# Patient Record
Sex: FEMALE | Race: WHITE
[De-identification: names, ages, dates, MRNs, and addresses within clinical notes are randomized per-mention and may not be internally consistent; named-entity substitution may affect disease eponyms.]

---

## 2017-02-20 ENCOUNTER — MYAURORA ACCOUNT LINK (OUTPATIENT)
Dept: OTHER | Age: 72
End: 2017-02-20

## 2017-02-20 ENCOUNTER — PRIOR ORIGINAL RECORDS (OUTPATIENT)
Dept: OTHER | Age: 72
End: 2017-02-20

## 2017-02-20 ENCOUNTER — LAB ENCOUNTER (OUTPATIENT)
Dept: LAB | Facility: HOSPITAL | Age: 72
End: 2017-02-20
Attending: INTERNAL MEDICINE
Payer: MEDICARE

## 2017-02-20 ENCOUNTER — HOSPITAL ENCOUNTER (OUTPATIENT)
Dept: GENERAL RADIOLOGY | Facility: HOSPITAL | Age: 72
Discharge: HOME | End: 2017-02-20
Attending: INTERNAL MEDICINE
Payer: MEDICARE

## 2017-02-20 DIAGNOSIS — Z01.818 PRE-OPERATIVE EXAM: ICD-10-CM

## 2017-02-20 DIAGNOSIS — Z01.818 PRE-OP TESTING: Primary | ICD-10-CM

## 2017-02-20 LAB
BASOPHILS # BLD AUTO: 0.04 X10(3) UL (ref 0–0.1)
BASOPHILS NFR BLD AUTO: 0.5 %
BUN BLD-MCNC: 16 MG/DL (ref 8–20)
CALCIUM BLD-MCNC: 10.3 MG/DL (ref 8.3–10.3)
CHLORIDE: 91 MMOL/L (ref 101–111)
CHOLEST SMN-MCNC: 175 MG/DL (ref ?–200)
CO2: 32 MMOL/L (ref 22–32)
CREAT BLD-MCNC: 0.77 MG/DL (ref 0.55–1.02)
EOSINOPHIL # BLD AUTO: 0.1 X10(3) UL (ref 0–0.3)
EOSINOPHIL NFR BLD AUTO: 1.3 %
ERYTHROCYTE [DISTWIDTH] IN BLOOD BY AUTOMATED COUNT: 14.7 % (ref 11.5–16)
GLUCOSE BLD-MCNC: 102 MG/DL (ref 70–99)
HCT VFR BLD AUTO: 36.5 % (ref 34–50)
HDLC SERPL-MCNC: 66 MG/DL (ref 45–?)
HDLC SERPL: 2.65 {RATIO} (ref ?–4.44)
HGB BLD-MCNC: 13.2 G/DL (ref 12–16)
IMMATURE GRANULOCYTE COUNT: 0.03 X10(3) UL (ref 0–1)
IMMATURE GRANULOCYTE RATIO %: 0.4 %
LDLC SERPL CALC-MCNC: 88 MG/DL (ref ?–130)
LYMPHOCYTES # BLD AUTO: 1.36 X10(3) UL (ref 0.9–4)
LYMPHOCYTES NFR BLD AUTO: 17 %
MCH RBC QN AUTO: 31.7 PG (ref 27–33.2)
MCHC RBC AUTO-ENTMCNC: 36.2 G/DL (ref 31–37)
MCV RBC AUTO: 87.7 FL (ref 81–100)
MONOCYTES # BLD AUTO: 0.94 X10(3) UL (ref 0.1–0.6)
MONOCYTES NFR BLD AUTO: 11.8 %
NEUTROPHIL ABS PRELIM: 5.52 X10 (3) UL (ref 1.3–6.7)
NEUTROPHILS # BLD AUTO: 5.52 X10(3) UL (ref 1.3–6.7)
NEUTROPHILS NFR BLD AUTO: 69 %
NONHDLC SERPL-MCNC: 109 MG/DL (ref ?–130)
PLATELET # BLD AUTO: 298 10(3)UL (ref 150–450)
POTASSIUM SERPL-SCNC: 3.6 MMOL/L (ref 3.6–5.1)
RBC # BLD AUTO: 4.16 X10(6)UL (ref 3.8–5.1)
RED CELL DISTRIBUTION WIDTH-SD: 47.7 FL (ref 35.1–46.3)
SODIUM SERPL-SCNC: 128 MMOL/L (ref 136–144)
TRIGLYCERIDES: 105 MG/DL (ref ?–150)
VLDL: 21 MG/DL (ref 5–40)
WBC # BLD AUTO: 8 X10(3) UL (ref 4–13)

## 2017-02-20 PROCEDURE — 85025 COMPLETE CBC W/AUTO DIFF WBC: CPT

## 2017-02-20 PROCEDURE — 80061 LIPID PANEL: CPT

## 2017-02-20 PROCEDURE — 36415 COLL VENOUS BLD VENIPUNCTURE: CPT

## 2017-02-20 PROCEDURE — 71020 XR CHEST PA + LAT CHEST (CPT=71020): CPT

## 2017-02-20 PROCEDURE — 80048 BASIC METABOLIC PNL TOTAL CA: CPT

## 2017-02-20 RX ORDER — DILTIAZEM HYDROCHLORIDE 180 MG/1
180 CAPSULE, COATED, EXTENDED RELEASE ORAL EVERY 12 HOURS
COMMUNITY
End: 2019-05-31

## 2017-02-20 RX ORDER — DULOXETIN HYDROCHLORIDE 30 MG/1
60 CAPSULE, DELAYED RELEASE ORAL NIGHTLY
COMMUNITY
End: 2019-05-31

## 2017-02-20 RX ORDER — PREDNISONE 1 MG/1
5 TABLET ORAL
COMMUNITY

## 2017-02-20 RX ORDER — GABAPENTIN 300 MG/1
600 CAPSULE ORAL NIGHTLY
COMMUNITY

## 2017-02-20 RX ORDER — ASPIRIN 325 MG
325 TABLET ORAL DAILY
COMMUNITY
End: 2019-05-31

## 2017-02-20 RX ORDER — GABAPENTIN 300 MG/1
600 CAPSULE ORAL DAILY
COMMUNITY

## 2017-02-20 RX ORDER — OMEGA-3/DHA/EPA/FISH OIL 500-1000MG
1500 CAPSULE ORAL DAILY
COMMUNITY

## 2017-02-20 RX ORDER — LEVOTHYROXINE SODIUM 0.05 MG/1
50 TABLET ORAL
COMMUNITY

## 2017-02-20 RX ORDER — METOPROLOL SUCCINATE 25 MG/1
25 TABLET, EXTENDED RELEASE ORAL 3 TIMES DAILY
COMMUNITY

## 2017-02-20 RX ORDER — CALCIUM/D3/MAG OX/COP/MANG/ZN 300 MG-20
1 TABLET ORAL DAILY
COMMUNITY

## 2017-02-20 RX ORDER — MELOXICAM 15 MG/1
15 TABLET ORAL DAILY
COMMUNITY

## 2017-02-20 RX ORDER — SIMVASTATIN 10 MG
10 TABLET ORAL NIGHTLY
COMMUNITY

## 2017-02-20 RX ORDER — HYDROCHLOROTHIAZIDE 25 MG/1
12.5 TABLET ORAL DAILY
COMMUNITY

## 2017-02-20 RX ORDER — PANTOPRAZOLE SODIUM 40 MG/1
40 TABLET, DELAYED RELEASE ORAL
COMMUNITY

## 2017-02-20 RX ORDER — GABAPENTIN 300 MG/1
300 CAPSULE ORAL
COMMUNITY
End: 2019-02-15

## 2017-02-20 RX ORDER — OXYBUTYNIN CHLORIDE 5 MG/1
5 TABLET ORAL DAILY
COMMUNITY

## 2017-02-20 NOTE — H&P
Razia Kumar  : 1945  ACCOUNT:  [id number]  [phone]  PCP: Dr. Camara Scale    TODAY'S DATE: 2017  DICTATED BY:  Kenan He M.D.]    CHIEF COMPLAINT: [Cardiac Risk Assessment and Consult.]    HPI:  [On 2017, Wayne Sanchez, a 69-year-ol OCCUPATION: retired. ALLERGIES: Prinivil - Oral and Vicodin - Oral    MEDICATIONS: Selected prescriptions see below    VITAL SIGNS: [B/P - 98/58 , Pulse - 60, Weight -  121, Height -   62 , BMI - 22.1 ]    CONS: well developed, well nourished.  WEIGHT: B her  who verbalized understanding and agreed to the procedure. ASSESSMENT:  1. Aneurysm, abdominal  2. COPD  3.  Tobacco abuse    PLAN: [Schedule her for outpatient cardiac catheterization if her preliminary blood work shows no renal insufficie

## 2017-02-21 ENCOUNTER — HOSPITAL ENCOUNTER (OUTPATIENT)
Dept: INTERVENTIONAL RADIOLOGY/VASCULAR | Facility: HOSPITAL | Age: 72
Discharge: HOME | End: 2017-02-21
Attending: INTERNAL MEDICINE | Admitting: INTERNAL MEDICINE
Payer: MEDICARE

## 2017-02-21 VITALS
BODY MASS INDEX: 22.08 KG/M2 | WEIGHT: 120 LBS | DIASTOLIC BLOOD PRESSURE: 86 MMHG | RESPIRATION RATE: 20 BRPM | OXYGEN SATURATION: 96 % | TEMPERATURE: 98 F | SYSTOLIC BLOOD PRESSURE: 134 MMHG | HEART RATE: 78 BPM | HEIGHT: 62 IN

## 2017-02-21 DIAGNOSIS — I72.3 ILIAC ANEURYSM (HCC): ICD-10-CM

## 2017-02-21 LAB
ATRIAL RATE: 70 BPM
BUN: 16 MG/DL
CALCIUM: 10.3 MG/DL
CHLORIDE: 91 MEQ/L
CHOLESTEROL, TOTAL: 175 MG/DL
CREATININE, SERUM: 0.77 MG/DL
GLUCOSE: 102 MG/DL
GLUCOSE: 102 MG/DL
HDL CHOLESTEROL: 66 MG/DL
HEMATOCRIT: 36.5 %
HEMOGLOBIN: 13.2 G/DL
LDL CHOLESTEROL: 88 MG/DL
NON-HDL CHOLESTEROL: 109 MG/DL
P AXIS: -24 DEGREES
P-R INTERVAL: 152 MS
PLATELETS: 298 K/UL
POTASSIUM, SERUM: 3.6 MEQ/L
Q-T INTERVAL: 388 MS
QRS DURATION: 80 MS
QTC CALCULATION (BEZET): 419 MS
R AXIS: 50 DEGREES
RED BLOOD COUNT: 4.16 X 10-6/U
SODIUM: 128 MEQ/L
T AXIS: 48 DEGREES
TRIGLYCERIDES: 105 MG/DL
VENTRICULAR RATE: 70 BPM
WHITE BLOOD COUNT: 8 X 10-3/U

## 2017-02-21 PROCEDURE — 93010 ELECTROCARDIOGRAM REPORT: CPT | Performed by: INTERNAL MEDICINE

## 2017-02-21 PROCEDURE — B2111ZZ FLUOROSCOPY OF MULTIPLE CORONARY ARTERIES USING LOW OSMOLAR CONTRAST: ICD-10-PCS | Performed by: INTERNAL MEDICINE

## 2017-02-21 PROCEDURE — 76942 ECHO GUIDE FOR BIOPSY: CPT

## 2017-02-21 PROCEDURE — 93454 CORONARY ARTERY ANGIO S&I: CPT

## 2017-02-21 PROCEDURE — 93005 ELECTROCARDIOGRAM TRACING: CPT

## 2017-02-21 RX ORDER — ASPIRIN 81 MG/1
324 TABLET, CHEWABLE ORAL ONCE
Status: DISCONTINUED | OUTPATIENT
Start: 2017-02-21 | End: 2017-02-21

## 2017-02-21 RX ORDER — LIDOCAINE HYDROCHLORIDE 10 MG/ML
INJECTION, SOLUTION INFILTRATION; PERINEURAL
Status: COMPLETED
Start: 2017-02-21 | End: 2017-02-21

## 2017-02-21 RX ORDER — HEPARIN SODIUM 5000 [USP'U]/ML
INJECTION, SOLUTION INTRAVENOUS; SUBCUTANEOUS
Status: COMPLETED
Start: 2017-02-21 | End: 2017-02-21

## 2017-02-21 RX ORDER — SODIUM CHLORIDE 9 MG/ML
INJECTION, SOLUTION INTRAVENOUS CONTINUOUS
Status: DISCONTINUED | OUTPATIENT
Start: 2017-02-21 | End: 2017-02-21

## 2017-02-21 RX ORDER — MIDAZOLAM HYDROCHLORIDE 1 MG/ML
INJECTION INTRAMUSCULAR; INTRAVENOUS
Status: COMPLETED
Start: 2017-02-21 | End: 2017-02-21

## 2017-02-21 RX ADMIN — SODIUM CHLORIDE: 9 INJECTION, SOLUTION INTRAVENOUS at 11:15:00

## 2017-02-22 NOTE — PROCEDURES
[facility]    PATIENT'S NAME: Valerio Banks   ATTENDING PHYSICIAN: Romana Curling, M.D. OPERATING PHYSICIAN: Tran Antunez M.D.    PATIENT ACCOUNT#:   [de-identified]    LOCATION:  [facility] 6 EDWP 10  MEDICAL RECORD #:   [id number]       DATE OF BIRTH: right common femoral artery site. No apparent acute complications were noted. The patient tolerated the procedure well. The patient had conscious sedation monitored by myself and the cath lab staff. This started at 56, ending at 80.     FINDINGS:  I

## 2017-02-22 NOTE — PROGRESS NOTES
Rc'd pt from cath lab in stable condition. VSS. Dr Ozzy Garcia at bedside and spoke with . Pt to stay in bed for 3hrs. See flowsheet. Dressing to right groin is clean, soft, dry and intact. No bleeding or hematoma.  After bedrest completed, pt ambulat

## 2017-02-28 ENCOUNTER — HOSPITAL ENCOUNTER (OUTPATIENT)
Dept: CT IMAGING | Facility: HOSPITAL | Age: 72
Discharge: HOME | End: 2017-02-28
Attending: SURGERY
Payer: MEDICARE

## 2017-02-28 ENCOUNTER — LAB ENCOUNTER (OUTPATIENT)
Dept: LAB | Facility: HOSPITAL | Age: 72
End: 2017-02-28
Attending: SURGERY
Payer: MEDICARE

## 2017-02-28 DIAGNOSIS — I72.3 ILIAC ARTERY ANEURYSM (HCC): ICD-10-CM

## 2017-02-28 DIAGNOSIS — I71.4 AAA (ABDOMINAL AORTIC ANEURYSM) (HCC): ICD-10-CM

## 2017-02-28 DIAGNOSIS — Z01.818 PRE-OP EXAM: Primary | ICD-10-CM

## 2017-02-28 LAB
BUN BLD-MCNC: 12 MG/DL (ref 8–20)
CREAT BLD-MCNC: 0.6 MG/DL (ref 0.55–1.02)

## 2017-02-28 PROCEDURE — 82565 ASSAY OF CREATININE: CPT

## 2017-02-28 PROCEDURE — 84520 ASSAY OF UREA NITROGEN: CPT

## 2017-02-28 PROCEDURE — 74174 CTA ABD&PLVS W/CONTRAST: CPT

## 2017-02-28 PROCEDURE — 36415 COLL VENOUS BLD VENIPUNCTURE: CPT

## 2017-03-16 ENCOUNTER — MYAURORA ACCOUNT LINK (OUTPATIENT)
Dept: OTHER | Age: 72
End: 2017-03-16

## 2017-03-16 ENCOUNTER — HOSPITAL ENCOUNTER (OUTPATIENT)
Dept: CV DIAGNOSTICS | Age: 72
Discharge: HOME | End: 2017-03-16
Attending: INTERNAL MEDICINE
Payer: MEDICARE

## 2017-03-16 DIAGNOSIS — I51.9 LV DYSFUNCTION: ICD-10-CM

## 2017-03-28 ENCOUNTER — PRIOR ORIGINAL RECORDS (OUTPATIENT)
Dept: OTHER | Age: 72
End: 2017-03-28

## 2017-03-31 ENCOUNTER — MYAURORA ACCOUNT LINK (OUTPATIENT)
Dept: OTHER | Age: 72
End: 2017-03-31

## 2017-03-31 ENCOUNTER — PRIOR ORIGINAL RECORDS (OUTPATIENT)
Dept: OTHER | Age: 72
End: 2017-03-31

## 2019-02-15 ENCOUNTER — HOSPITAL ENCOUNTER (EMERGENCY)
Age: 74
Discharge: HOME | End: 2019-02-15
Attending: EMERGENCY MEDICINE
Payer: MEDICARE

## 2019-02-15 VITALS
DIASTOLIC BLOOD PRESSURE: 59 MMHG | HEIGHT: 60 IN | RESPIRATION RATE: 18 BRPM | SYSTOLIC BLOOD PRESSURE: 112 MMHG | BODY MASS INDEX: 20.81 KG/M2 | TEMPERATURE: 98 F | HEART RATE: 60 BPM | WEIGHT: 106 LBS

## 2019-02-15 DIAGNOSIS — S41.112A SKIN TEAR OF UPPER ARM WITHOUT COMPLICATION, LEFT, INITIAL ENCOUNTER: Primary | ICD-10-CM

## 2019-02-15 PROCEDURE — 99282 EMERGENCY DEPT VISIT SF MDM: CPT

## 2019-02-15 NOTE — ED NOTES
Patient states she has an allergy to tetanus vaccination - Patient states she cannot recall the reaction.  MD notified

## 2019-02-15 NOTE — ED PROVIDER NOTES
Patient Seen in: [facility] In [place]    History   Patient presents with:  Laceration Abrasion (integumentary)    Stated Complaint: SKIN TEAR TO LEFT ARM.   DENIES LOC    HPI    Patient has a history of rheumatoid arthritis which has (Oral)   Resp 18   Ht 152.4 cm (5')   Wt 48.1 kg   BMI 20.70 kg/m²         Physical Exam  General: The patient is awake, alert, conversant. Patient answers questions quickly and appropriately. Her speech is clear. There is facial symmetry.   Scalp is atr

## 2019-03-01 VITALS
HEIGHT: 62 IN | WEIGHT: 118 LBS | SYSTOLIC BLOOD PRESSURE: 110 MMHG | HEART RATE: 74 BPM | BODY MASS INDEX: 21.71 KG/M2 | DIASTOLIC BLOOD PRESSURE: 65 MMHG

## 2019-03-01 VITALS
SYSTOLIC BLOOD PRESSURE: 98 MMHG | HEIGHT: 62 IN | WEIGHT: 121 LBS | DIASTOLIC BLOOD PRESSURE: 58 MMHG | HEART RATE: 60 BPM | BODY MASS INDEX: 22.26 KG/M2

## 2019-05-31 ENCOUNTER — HOSPITAL ENCOUNTER (EMERGENCY)
Age: 74
Discharge: HOME | End: 2019-05-31
Attending: EMERGENCY MEDICINE
Payer: MEDICARE

## 2019-05-31 VITALS
TEMPERATURE: 98 F | RESPIRATION RATE: 15 BRPM | DIASTOLIC BLOOD PRESSURE: 64 MMHG | BODY MASS INDEX: 20.81 KG/M2 | OXYGEN SATURATION: 91 % | SYSTOLIC BLOOD PRESSURE: 129 MMHG | HEART RATE: 66 BPM | WEIGHT: 106 LBS | HEIGHT: 60 IN

## 2019-05-31 DIAGNOSIS — S80.811A INFECTED ABRASION OF LOWER LEG, RIGHT, INITIAL ENCOUNTER: ICD-10-CM

## 2019-05-31 DIAGNOSIS — S51.012A SKIN TEAR OF LEFT ELBOW WITHOUT COMPLICATION, INITIAL ENCOUNTER: Primary | ICD-10-CM

## 2019-05-31 DIAGNOSIS — L08.9 INFECTED ABRASION OF LOWER LEG, RIGHT, INITIAL ENCOUNTER: ICD-10-CM

## 2019-05-31 PROCEDURE — 99283 EMERGENCY DEPT VISIT LOW MDM: CPT

## 2019-05-31 RX ORDER — CEPHALEXIN 500 MG/1
500 CAPSULE ORAL 4 TIMES DAILY
Qty: 40 CAPSULE | Refills: 0 | Status: SHIPPED | OUTPATIENT
Start: 2019-05-31 | End: 2019-06-10

## 2019-05-31 NOTE — ED INITIAL ASSESSMENT (HPI)
Pt here for abrasion to right shin due to fall one week ago . Hx of falls due to left foot problems.     1 1/2 weeks ago fell and hit head, no loc

## 2019-05-31 NOTE — ED PROVIDER NOTES
Patient Seen in: [facility] Emergency Department In [place]    History   Patient presents with:  Laceration Abrasion (integumentary)    Stated Complaint: Slipped and fell a week ago- sustained an abrasion to right shin.     64-year-old  female wit file      Review of Systems    Positive for stated complaint: Slipped and fell a week ago- sustained an abrasion to right shin. Other systems are as noted in HPI. Constitutional and vital signs reviewed.       All other systems reviewed and negative excep [id number]  [phone]    Schedule an appointment as soon as possible for a visit in 1 day          Medications Prescribed:  Current Discharge Medication List    START taking these medications    mupirocin 2 % External Ointment  Apply 1 Application topic

## 2019-06-12 ENCOUNTER — HOSPITAL ENCOUNTER (EMERGENCY)
Age: 74
Discharge: HOME | End: 2019-06-12
Payer: MEDICARE

## 2019-06-12 VITALS
RESPIRATION RATE: 20 BRPM | WEIGHT: 105.81 LBS | SYSTOLIC BLOOD PRESSURE: 111 MMHG | OXYGEN SATURATION: 94 % | HEART RATE: 60 BPM | DIASTOLIC BLOOD PRESSURE: 60 MMHG | BODY MASS INDEX: 21 KG/M2 | TEMPERATURE: 97 F

## 2019-06-12 DIAGNOSIS — S81.811A LACERATION OF SKIN OF RIGHT LOWER LEG, INITIAL ENCOUNTER: Primary | ICD-10-CM

## 2019-06-12 PROCEDURE — 12002 RPR S/N/AX/GEN/TRNK2.6-7.5CM: CPT

## 2019-06-12 PROCEDURE — 99283 EMERGENCY DEPT VISIT LOW MDM: CPT

## 2019-06-12 PROCEDURE — 99282 EMERGENCY DEPT VISIT SF MDM: CPT

## 2019-06-12 NOTE — ED INITIAL ASSESSMENT (HPI)
States she tripped and fell this am injuring her right lower leg on her walker causing a large lac. No active bleeding.   No other complaints

## 2019-06-12 NOTE — ED PROVIDER NOTES
Patient Seen in: [facility] In [place]    History   Patient presents with:  Laceration Abrasion (integumentary)  Lower Extremity Injury (musculoskeletal)    Stated Complaint: right lower leg injury/laceration;  Pt stated that she fell Alcohol use: No    Drug use: Not on file      Review of Systems    Positive for stated complaint: right lower leg injury/laceration; Pt stated that she fell this morning; NO LOC*  Other systems are as noted in HPI.   Constitutional and vital signs reviewe Wound is evaluated in bloodless field. No tendon or bony involvement noted. No foreign body appreciated. 20 simple interrupted sutures placed using 4-0 Ethilon suture. Wound edges are approximated  hemostasis obtained. NV intact s/p procedure.    Peter Burgess Disposition and Plan     Clinical Impression:  Laceration of skin of right lower leg, initial encounter  (primary encounter diagnosis)    Disposition:  Discharge  6/12/2019  1:19 pm    Follow-up:  Kalen Washington  [address]

## 2019-09-04 ENCOUNTER — HOSPITAL ENCOUNTER (EMERGENCY)
Age: 74
Discharge: HOME | End: 2019-09-04
Attending: EMERGENCY MEDICINE
Payer: MEDICARE

## 2019-09-04 ENCOUNTER — APPOINTMENT (OUTPATIENT)
Dept: CT IMAGING | Age: 74
End: 2019-09-04
Attending: PHYSICIAN ASSISTANT
Payer: MEDICARE

## 2019-09-04 ENCOUNTER — APPOINTMENT (OUTPATIENT)
Dept: GENERAL RADIOLOGY | Age: 74
End: 2019-09-04
Attending: PHYSICIAN ASSISTANT
Payer: MEDICARE

## 2019-09-04 VITALS
OXYGEN SATURATION: 92 % | WEIGHT: 103 LBS | TEMPERATURE: 98 F | SYSTOLIC BLOOD PRESSURE: 123 MMHG | RESPIRATION RATE: 18 BRPM | HEIGHT: 61 IN | BODY MASS INDEX: 19.45 KG/M2 | HEART RATE: 63 BPM | DIASTOLIC BLOOD PRESSURE: 67 MMHG

## 2019-09-04 DIAGNOSIS — S51.012A SKIN TEAR OF LEFT ELBOW WITHOUT COMPLICATION, INITIAL ENCOUNTER: Primary | ICD-10-CM
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
## 2019-09-04 PROCEDURE — 99284 EMERGENCY DEPT VISIT MOD MDM: CPT

## 2019-09-04 PROCEDURE — 70450 CT HEAD/BRAIN W/O DYE: CPT | Performed by: PHYSICIAN ASSISTANT

## 2019-09-04 PROCEDURE — 73060 X-RAY EXAM OF HUMERUS: CPT | Performed by: PHYSICIAN ASSISTANT

## 2019-09-04 NOTE — ED INITIAL ASSESSMENT (HPI)
Patient states she fell out of bed this morning, has skin tear to left elbow area. Having left shoulder pain.    states patient had tetanus vaccine within the last year

## 2019-09-04 NOTE — ED PROVIDER NOTES
Patient Seen in: THE [facility] Department In [place]    History   Patient presents with:  Laceration Abrasion (integumentary)    Stated Complaint: fell while getting out of bed this am-skin tear to left arm    HPI    Ciara Sunshine is a 24-year-old female wh complaint: fell while getting out of bed this am-skin tear to left arm  Other systems are as noted in HPI. Constitutional and vital signs reviewed. All other systems reviewed and negative except as noted above.     Physical Exam     ED Triage Vitals [ is near complete obliteration of the glenohumeral joint with subchondral sclerosis and slight remodeling of the humeral head most consistent with degenerative change. Adjacent 1.5 cm area of sclerosis involves the humeral neck. CONCLUSION:  1.  No acu surrounding area. CONCLUSION:  1. No acute intracranial hemorrhage. 2. Findings most consistent with chronic small vessel ischemic change within the deep white matter.     Dictated by: Diallo Zavala MD on 9/04/2019 at 16:57     Approved by: Diallo Zavala MD encounter    Disposition:  Discharge  9/4/2019  5:24 pm    Follow-up:  Gustavo Nelson  [place] [id number] [phone]    In 2 days          Medications Prescribed:  Discharge Medication List as of 9/4/2019  5:25 PM

## (undated) NOTE — ED AVS SNAPSHOT
Marilu Coma   MRN: [id number]    Department:  [facility] Emergency Department in [place]   Date of Visit:  9/4/2019           Disclosure     Insurance plans vary and the physician(s) referred by the ER may not be covered by your plan.  Please contact y tell this physician (or your personal doctor if your instructions are to return to your personal doctor) about any new or lasting problems. The primary care or specialist physician will see patients referred from the [facility] Emergency Department.  Salvadore Skiff

## (undated) NOTE — ED AVS SNAPSHOT
Evette Stubbs   MRN: [id number]    Department:  THE [facility] Emergency Department in [place]   Date of Visit:  2/15/2019           Disclosure     Insurance plans vary and the physician(s) referred by the ER may not be covered by your plan.  Please contact tell this physician (or your personal doctor if your instructions are to return to your personal doctor) about any new or lasting problems. The primary care or specialist physician will see patients referred from the [facility] Emergency Department.  Andrea Gomez

## (undated) NOTE — ED AVS SNAPSHOT
Carrington Johnson   MRN: [id number]    Department:  [facility] Emergency Department in [place]   Date of Visit:  6/12/2019           Disclosure     Insurance plans vary and the physician(s) referred by the ER may not be covered by your plan.  Please contact tell this physician (or your personal doctor if your instructions are to return to your personal doctor) about any new or lasting problems. The primary care or specialist physician will see patients referred from the [facility] Emergency Department.  Severo Pastures

## (undated) NOTE — ED AVS SNAPSHOT
Chauncey Frances   MRN: [id number]    Department:  [facility] in [place]   Date of Visit:  5/31/2019           Disclosure     Insurance plans vary and the physician(s) referred by the ER may not be covered by your plan.  Please contact tell this physician (or your personal doctor if your instructions are to return to your personal doctor) about any new or lasting problems. The primary care or specialist physician will see patients referred from the [facility] Emergency Department.  Shahid Thomas

## (undated) NOTE — IP AVS SNAPSHOT
[facility] [place] [address] [place], [address] ~ [phone]                Discharge Summary   2/21/2017    Kade           Admission Information        Provider Department    2/21/2017 Sachi Delgado MD [facility] [    ]    [    ]    [    ]    [    ]       gabapentin 300 MG Caps   Commonly known as:  NEURONTIN        Take 600 mg by mouth nightly.       [    ]    [    ]    [    ]    [    ]       hydrochlorothiazide 25 MG Tabs   Commonly known as:  HYDRODIURIL [    ]    [    ]    [    ]    [    ]                 Patient Instructions       Follow pre printed post procedure instructions. Dr Kim Tong office will call in regards to upcoming surgery.      Immunization History as of 2/21/2017  Never Reviewed    N coverage. Patient [address] is a [employer] program that can help with your Affordable Care Act coverage, as well as all types of Medicaid plans.   To get signed up and covered, please call [phone] and ask to get set up for an insuran Use: Treat abnormal blood pressure (high or low), cardiac conditions; and/or abnormal heart rates/rhythms   Most common side effects: Dizziness or feeling lightheaded (especially with standing), heart rate changes, headaches, nausea/vomiting   What to repo Use:  Nausea/vomiting, acid reflux, low bowel motility, stomach pain   Most common side effects:  Depends on the specific medication but generally include: diarrhea, constipation, headache, allergic reaction (itching, rash, hives)   What to report to your movement, drowsiness, shaking           All Other Medications     Calcium Carbonate-Vit D-Min (CALTRATE MINIS PLUS MINERALS) 300-800 MG-UNIT Oral Tab    Magnesium 400 MG Oral Cap    Omega-3 Fatty Acids (OMEGA 3 500) 500 MG Oral Cap    Oxybutynin Chloride 5